# Patient Record
Sex: FEMALE | Race: BLACK OR AFRICAN AMERICAN | NOT HISPANIC OR LATINO
[De-identification: names, ages, dates, MRNs, and addresses within clinical notes are randomized per-mention and may not be internally consistent; named-entity substitution may affect disease eponyms.]

---

## 2019-07-30 PROBLEM — Z00.00 ENCOUNTER FOR PREVENTIVE HEALTH EXAMINATION: Status: ACTIVE | Noted: 2019-07-30

## 2019-08-01 ENCOUNTER — APPOINTMENT (OUTPATIENT)
Dept: PHYSICAL MEDICINE AND REHAB | Facility: CLINIC | Age: 64
End: 2019-08-01
Payer: COMMERCIAL

## 2019-08-01 VITALS — BODY MASS INDEX: 32.95 KG/M2 | HEIGHT: 64 IN | WEIGHT: 193 LBS

## 2019-08-01 DIAGNOSIS — M16.11 UNILATERAL PRIMARY OSTEOARTHRITIS, RIGHT HIP: ICD-10-CM

## 2019-08-01 PROCEDURE — 77002 NEEDLE LOCALIZATION BY XRAY: CPT

## 2019-08-01 PROCEDURE — 73502 X-RAY EXAM HIP UNI 2-3 VIEWS: CPT | Mod: RT

## 2019-08-01 PROCEDURE — 27093 INJECTION FOR HIP X-RAY: CPT | Mod: RT

## 2019-08-01 RX ORDER — MELOXICAM 7.5 MG/1
7.5 TABLET ORAL TWICE DAILY
Qty: 60 | Refills: 0 | Status: ACTIVE | COMMUNITY
Start: 2019-08-01 | End: 1900-01-01

## 2019-08-01 RX ORDER — ATENOLOL AND CHLORTHALIDONE 50; 25 MG/1; MG/1
50-25 TABLET ORAL
Qty: 30 | Refills: 0 | Status: ACTIVE | COMMUNITY
Start: 2019-02-26

## 2019-08-01 RX ORDER — DOCUSATE SODIUM 100 MG/1
100 CAPSULE ORAL
Qty: 20 | Refills: 0 | Status: DISCONTINUED | COMMUNITY
Start: 2019-06-25 | End: 2019-08-01

## 2019-08-01 RX ORDER — CEPHALEXIN 500 MG/1
500 CAPSULE ORAL
Qty: 21 | Refills: 0 | Status: DISCONTINUED | COMMUNITY
Start: 2019-06-25

## 2019-08-01 RX ORDER — OFLOXACIN 3 MG/ML
0.3 SOLUTION/ DROPS OPHTHALMIC
Qty: 5 | Refills: 0 | Status: DISCONTINUED | COMMUNITY
Start: 2019-03-08 | End: 2019-08-01

## 2019-08-01 RX ORDER — DIAZEPAM 5 MG/1
5 TABLET ORAL
Qty: 21 | Refills: 0 | Status: DISCONTINUED | COMMUNITY
Start: 2019-06-25 | End: 2019-08-01

## 2019-08-01 RX ORDER — HYDROCODONE BITARTRATE AND ACETAMINOPHEN 5; 325 MG/1; MG/1
5-325 TABLET ORAL
Qty: 30 | Refills: 0 | Status: DISCONTINUED | COMMUNITY
Start: 2019-06-25 | End: 2019-08-01

## 2019-08-01 NOTE — PROCEDURE
[de-identified] : Fluoroscopically Guided, Contrast Enhanced  Right Hip Injection\par \par After informed consent, she  was placed in a supine position.  Site was cleaned and prepped in a sterile manner.  Using fluoroscopic guidance, a 3.5 in needle was guided down to the femoral head/neck junction.  After negative aspiration for blood, contrast was instilled.  It showed good flow into the joint.  Then a solution of 40 mg Kenalog and 4 ml 1% Lidocaine was injected.  Bandage was applied and she was discharged in good condition. \par \par Discharge Instructions: No exercise for 4 days and no soaking but she can take a shower.\par

## 2019-08-01 NOTE — HISTORY OF PRESENT ILLNESS
[FreeTextEntry1] : Location: right hip\par Quality: aching; frequent \par Severity: \par Duration: over 2 years \par Context atraumatic \par Aggravating Factors: twisting/turning; going from sit to stand; bending/squatting \par Alleviating Factors: NSAIDS \par Associated Symptoms: no weakness; no buckling; no instability; no catching/locking; no swelling; no ecchymosis; no redness; no fever/chills; no warmth; no drainage; no weight loss; no clicking/popping; no grinding; no numbness/tingling; no change in bowel/bladder habits; no radiation down leg; stiffness; back pain \par Prior Studies: x ray\par \par 1/19/18 right hip injection - moderate relief\par 1/2019 right hip injection

## 2019-08-01 NOTE — DATA REVIEWED
[Plain X-Rays] : plain X-Rays [FreeTextEntry1] : right hip xray: moderate/severe OA medially, mild superiorly